# Patient Record
Sex: MALE | Race: WHITE
[De-identification: names, ages, dates, MRNs, and addresses within clinical notes are randomized per-mention and may not be internally consistent; named-entity substitution may affect disease eponyms.]

---

## 2017-01-23 ENCOUNTER — HOSPITAL ENCOUNTER (OUTPATIENT)
Dept: HOSPITAL 53 - SKLAB4 | Age: 82
End: 2017-01-23
Attending: FAMILY MEDICINE
Payer: MEDICARE

## 2017-01-23 DIAGNOSIS — G20: ICD-10-CM

## 2017-01-23 DIAGNOSIS — I10: Primary | ICD-10-CM

## 2017-01-23 LAB
ALBUMIN SERPL BCG-MCNC: 3.4 GM/DL (ref 3.2–5.2)
ALBUMIN/GLOB SERPL: 0.83 {RATIO} (ref 1–1.93)
ALP SERPL-CCNC: 126 U/L (ref 45–117)
ALT SERPL W P-5'-P-CCNC: 6 U/L (ref 12–78)
ANION GAP SERPL CALC-SCNC: 8 MEQ/L (ref 8–16)
AST SERPL-CCNC: 15 U/L (ref 15–37)
BILIRUB SERPL-MCNC: 0.3 MG/DL (ref 0.2–1)
BUN SERPL-MCNC: 22 MG/DL (ref 7–18)
CALCIUM SERPL-MCNC: 8.7 MG/DL (ref 8.8–10.2)
CHLORIDE SERPL-SCNC: 100 MEQ/L (ref 98–107)
CO2 SERPL-SCNC: 28 MEQ/L (ref 21–32)
CREAT SERPL-MCNC: 1.08 MG/DL (ref 0.7–1.3)
ERYTHROCYTE [DISTWIDTH] IN BLOOD BY AUTOMATED COUNT: 13.2 % (ref 11.5–14.5)
GFR SERPL CREATININE-BSD FRML MDRD: > 60 ML/MIN/{1.73_M2} (ref 35–?)
GLUCOSE SERPL-MCNC: 180 MG/DL (ref 83–110)
MCH RBC QN AUTO: 30.3 PG (ref 27–33)
MCHC RBC AUTO-ENTMCNC: 33.7 G/DL (ref 32–36.5)
MCV RBC AUTO: 89.9 FL (ref 80–96)
PLATELET # BLD AUTO: 246 K/MM3 (ref 150–450)
POTASSIUM SERPL-SCNC: 4.5 MEQ/L (ref 3.5–5.1)
PROT SERPL-MCNC: 7.5 GM/DL (ref 6.4–8.2)
SODIUM SERPL-SCNC: 136 MEQ/L (ref 136–145)
WBC # BLD AUTO: 11.1 K/MM3 (ref 4–10)

## 2017-02-21 ENCOUNTER — HOSPITAL ENCOUNTER (OUTPATIENT)
Dept: HOSPITAL 53 - SKLAB8 | Age: 82
End: 2017-02-21
Attending: FAMILY MEDICINE

## 2017-02-21 ENCOUNTER — HOSPITAL ENCOUNTER (OUTPATIENT)
Dept: HOSPITAL 53 - SKLAB8 | Age: 82
End: 2017-02-21
Attending: FAMILY MEDICINE
Payer: MEDICARE

## 2017-02-21 DIAGNOSIS — E11.65: Primary | ICD-10-CM

## 2017-02-21 DIAGNOSIS — Z00.00: Primary | ICD-10-CM

## 2017-03-16 ENCOUNTER — HOSPITAL ENCOUNTER (OUTPATIENT)
Dept: HOSPITAL 53 - SKLAB8 | Age: 82
End: 2017-03-16
Attending: FAMILY MEDICINE
Payer: MEDICARE

## 2017-03-16 DIAGNOSIS — R73.09: Primary | ICD-10-CM

## 2017-03-16 LAB — EST. AVERAGE GLUCOSE BLD GHB EST-MCNC: 217 MG/DL (ref 60–110)

## 2017-04-20 ENCOUNTER — HOSPITAL ENCOUNTER (OUTPATIENT)
Dept: HOSPITAL 53 - SKLAB8 | Age: 82
End: 2017-04-20
Attending: FAMILY MEDICINE
Payer: MEDICARE

## 2017-04-20 DIAGNOSIS — G20: Primary | ICD-10-CM

## 2017-04-20 LAB
ALBUMIN SERPL BCG-MCNC: 3.4 GM/DL (ref 3.2–5.2)
ALBUMIN/GLOB SERPL: 0.89 {RATIO} (ref 1–1.93)
ALP SERPL-CCNC: 119 U/L (ref 45–117)
ALT SERPL W P-5'-P-CCNC: 6 U/L (ref 12–78)
ANION GAP SERPL CALC-SCNC: 7 MEQ/L (ref 8–16)
AST SERPL-CCNC: 9 U/L (ref 15–37)
BILIRUB SERPL-MCNC: 0.3 MG/DL (ref 0.2–1)
BUN SERPL-MCNC: 20 MG/DL (ref 7–18)
CALCIUM SERPL-MCNC: 8.3 MG/DL (ref 8.8–10.2)
CHLORIDE SERPL-SCNC: 99 MEQ/L (ref 98–107)
CO2 SERPL-SCNC: 29 MEQ/L (ref 21–32)
CREAT SERPL-MCNC: 1.17 MG/DL (ref 0.7–1.3)
GFR SERPL CREATININE-BSD FRML MDRD: > 60 ML/MIN/{1.73_M2} (ref 35–?)
GLUCOSE SERPL-MCNC: 223 MG/DL (ref 83–110)
POTASSIUM SERPL-SCNC: 4.1 MEQ/L (ref 3.5–5.1)
PROT SERPL-MCNC: 7.2 GM/DL (ref 6.4–8.2)
SODIUM SERPL-SCNC: 135 MEQ/L (ref 136–145)

## 2017-06-15 ENCOUNTER — HOSPITAL ENCOUNTER (OUTPATIENT)
Dept: HOSPITAL 53 - SKLAB8 | Age: 82
End: 2017-06-15
Attending: FAMILY MEDICINE
Payer: MEDICARE

## 2017-06-15 DIAGNOSIS — E78.2: Primary | ICD-10-CM

## 2017-06-15 DIAGNOSIS — R73.01: ICD-10-CM

## 2017-06-15 LAB
ANION GAP SERPL CALC-SCNC: 5 MEQ/L (ref 8–16)
BUN SERPL-MCNC: 22 MG/DL (ref 7–18)
CALCIUM SERPL-MCNC: 8.4 MG/DL (ref 8.8–10.2)
CHLORIDE SERPL-SCNC: 98 MEQ/L (ref 98–107)
CHOLEST SERPL-MCNC: 186 MG/DL (ref ?–200)
CO2 SERPL-SCNC: 33 MEQ/L (ref 21–32)
CREAT SERPL-MCNC: 1.09 MG/DL (ref 0.7–1.3)
ERYTHROCYTE [DISTWIDTH] IN BLOOD BY AUTOMATED COUNT: 13.2 % (ref 11.5–14.5)
EST. AVERAGE GLUCOSE BLD GHB EST-MCNC: 209 MG/DL (ref 60–110)
GFR SERPL CREATININE-BSD FRML MDRD: > 60 ML/MIN/{1.73_M2} (ref 35–?)
GLUCOSE SERPL-MCNC: 287 MG/DL (ref 83–110)
MCH RBC QN AUTO: 30.6 PG (ref 27–33)
MCHC RBC AUTO-ENTMCNC: 32.4 G/DL (ref 32–36.5)
MCV RBC AUTO: 94.7 FL (ref 80–96)
PLATELET # BLD AUTO: 287 K/MM3 (ref 150–450)
POTASSIUM SERPL-SCNC: 4.6 MEQ/L (ref 3.5–5.1)
SODIUM SERPL-SCNC: 136 MEQ/L (ref 136–145)
TRIGL SERPL-MCNC: 91 MG/DL (ref ?–150)
WBC # BLD AUTO: 11.3 K/MM3 (ref 4–10)

## 2017-06-19 LAB — PHENOBARBITAL (PRIMIDONE): 4 UG/ML (ref 15–40)

## 2017-06-23 ENCOUNTER — HOSPITAL ENCOUNTER (OUTPATIENT)
Dept: HOSPITAL 53 - SKLAB8 | Age: 82
End: 2017-06-23
Attending: FAMILY MEDICINE
Payer: MEDICARE

## 2017-06-23 DIAGNOSIS — N39.9: Primary | ICD-10-CM

## 2017-06-23 LAB
ALBUMIN SERPL BCG-MCNC: 3.2 GM/DL (ref 3.2–5.2)
ALBUMIN/GLOB SERPL: 0.91 {RATIO} (ref 1–1.93)
ALP SERPL-CCNC: 137 U/L (ref 45–117)
ALT SERPL W P-5'-P-CCNC: < 6 U/L (ref 12–78)
ANION GAP SERPL CALC-SCNC: 8 MEQ/L (ref 8–16)
AST SERPL-CCNC: 17 U/L (ref 15–37)
BILIRUB SERPL-MCNC: 0.2 MG/DL (ref 0.2–1)
BUN SERPL-MCNC: 25 MG/DL (ref 7–18)
CALCIUM SERPL-MCNC: 8.2 MG/DL (ref 8.8–10.2)
CHLORIDE SERPL-SCNC: 102 MEQ/L (ref 98–107)
CO2 SERPL-SCNC: 27 MEQ/L (ref 21–32)
CREAT SERPL-MCNC: 1.18 MG/DL (ref 0.7–1.3)
ERYTHROCYTE [DISTWIDTH] IN BLOOD BY AUTOMATED COUNT: 13.4 % (ref 11.5–14.5)
GFR SERPL CREATININE-BSD FRML MDRD: > 60 ML/MIN/{1.73_M2} (ref 35–?)
GLUCOSE SERPL-MCNC: 110 MG/DL (ref 83–110)
MCH RBC QN AUTO: 31.7 PG (ref 27–33)
MCHC RBC AUTO-ENTMCNC: 34.6 G/DL (ref 32–36.5)
MCV RBC AUTO: 91.5 FL (ref 80–96)
PLATELET # BLD AUTO: 317 K/MM3 (ref 150–450)
POTASSIUM SERPL-SCNC: 4.9 MEQ/L (ref 3.5–5.1)
PROT SERPL-MCNC: 6.7 GM/DL (ref 6.4–8.2)
SODIUM SERPL-SCNC: 137 MEQ/L (ref 136–145)
WBC # BLD AUTO: 11.5 K/MM3 (ref 4–10)

## 2017-06-26 ENCOUNTER — HOSPITAL ENCOUNTER (OUTPATIENT)
Dept: HOSPITAL 53 - SKLAB8 | Age: 82
End: 2017-06-26
Attending: FAMILY MEDICINE
Payer: MEDICARE

## 2017-06-26 DIAGNOSIS — I49.8: Primary | ICD-10-CM

## 2017-06-28 ENCOUNTER — HOSPITAL ENCOUNTER (OUTPATIENT)
Dept: HOSPITAL 53 - SKLAB8 | Age: 82
End: 2017-06-28
Attending: FAMILY MEDICINE
Payer: MEDICARE

## 2017-06-28 DIAGNOSIS — E03.9: Primary | ICD-10-CM

## 2017-06-28 LAB — T4 FREE SERPL-MCNC: 0.39 NG/DL (ref 0.76–1.46)

## 2017-06-30 ENCOUNTER — HOSPITAL ENCOUNTER (OUTPATIENT)
Dept: HOSPITAL 53 - SKLAB8 | Age: 82
End: 2017-06-30
Attending: NURSE PRACTITIONER
Payer: MEDICARE

## 2017-06-30 DIAGNOSIS — I44.7: Primary | ICD-10-CM

## 2017-06-30 NOTE — ECGEPIP
Stationary ECG Study

                              [facility]

                                       

                                       Test Date:    2017

Pat Name:     SHARON MOODY             Department:   

Patient ID:   [id number]                 Room:         -

Gender:       M                        Technician:   SHIRLEY

:          1935               Requested By: PING VITAL

Order Number: [id number]     Reading MD:   Iris Bowden

                                 Measurements

Intervals                              Axis          

Rate:         53                       P:            87

MA:           180                      QRS:          15

QRSD:         145                      T:            40

QT:           477                                    

QTc:          452                                    

                           Interpretive Statements

SINUS BRADYCARDIA

RIGHT BUNDLE BRANCH BLOCK

SINCE 1/30/15 RIGHT BUNDLE BRANCH BLOCK IS NEW 

 

Electronically Signed On 2017 13:59:33 EDT by Iris Bowden

## 2017-08-04 ENCOUNTER — HOSPITAL ENCOUNTER (OUTPATIENT)
Dept: HOSPITAL 53 - SKLAB8 | Age: 82
End: 2017-08-04
Attending: FAMILY MEDICINE
Payer: MEDICARE

## 2017-08-04 DIAGNOSIS — E07.9: Primary | ICD-10-CM

## 2017-08-25 ENCOUNTER — HOSPITAL ENCOUNTER (OUTPATIENT)
Dept: HOSPITAL 53 - SKLAB8 | Age: 82
End: 2017-08-25
Attending: FAMILY MEDICINE
Payer: MEDICARE

## 2017-08-25 DIAGNOSIS — N39.9: Primary | ICD-10-CM

## 2017-08-25 LAB
ALBUMIN SERPL BCG-MCNC: 3.5 GM/DL (ref 3.2–5.2)
ALBUMIN/GLOB SERPL: 0.85 {RATIO} (ref 1–1.93)
ALP SERPL-CCNC: 122 U/L (ref 45–117)
ALT SERPL W P-5'-P-CCNC: 6 U/L (ref 12–78)
ANION GAP SERPL CALC-SCNC: 8 MEQ/L (ref 8–16)
AST SERPL-CCNC: 16 U/L (ref 15–37)
BASOPHILS # BLD AUTO: 0.1 K/MM3 (ref 0–0.2)
BASOPHILS NFR BLD AUTO: 0.5 % (ref 0–1)
BILIRUB SERPL-MCNC: 0.2 MG/DL (ref 0.2–1)
BUN SERPL-MCNC: 23 MG/DL (ref 7–18)
CALCIUM SERPL-MCNC: 8.4 MG/DL (ref 8.8–10.2)
CHLORIDE SERPL-SCNC: 101 MEQ/L (ref 98–107)
CO2 SERPL-SCNC: 29 MEQ/L (ref 21–32)
CREAT SERPL-MCNC: 0.96 MG/DL (ref 0.7–1.3)
EOSINOPHIL # BLD AUTO: 0.4 K/MM3 (ref 0–0.5)
EOSINOPHIL NFR BLD AUTO: 1.6 % (ref 0–3)
ERYTHROCYTE [DISTWIDTH] IN BLOOD BY AUTOMATED COUNT: 13.8 % (ref 11.5–14.5)
GFR SERPL CREATININE-BSD FRML MDRD: > 60 ML/MIN/{1.73_M2} (ref 35–?)
GLUCOSE SERPL-MCNC: 181 MG/DL (ref 83–110)
LARGE UNSTAINED CELL #: 0.2 K/MM3 (ref 0–0.4)
LARGE UNSTAINED CELL %: 0.7 % (ref 0–4)
LYMPHOCYTES # BLD AUTO: 0.9 K/MM3 (ref 1.5–4.5)
LYMPHOCYTES NFR BLD AUTO: 3.4 % (ref 24–44)
MCH RBC QN AUTO: 30.8 PG (ref 27–33)
MCHC RBC AUTO-ENTMCNC: 33.4 G/DL (ref 32–36.5)
MCV RBC AUTO: 92 FL (ref 80–96)
MONOCYTES # BLD AUTO: 1 K/MM3 (ref 0–0.8)
MONOCYTES NFR BLD AUTO: 4.7 % (ref 0–5)
NEUTROPHILS # BLD AUTO: 18.8 K/MM3 (ref 1.8–7.7)
NEUTROPHILS NFR BLD AUTO: 89.1 % (ref 36–66)
PLATELET # BLD AUTO: 348 K/MM3 (ref 150–450)
POTASSIUM SERPL-SCNC: 4.2 MEQ/L (ref 3.5–5.1)
PROT SERPL-MCNC: 7.6 GM/DL (ref 6.4–8.2)
SODIUM SERPL-SCNC: 138 MEQ/L (ref 136–145)
WBC # BLD AUTO: 21 K/MM3 (ref 4–10)

## 2017-08-25 NOTE — REP
PORTABLE CHEST:

 

AP portable view of the chest is performed and compared to a prior study of

05/24/2015.

 

There is stable bibasilar fibroatelectatic change compared to the prior study,

with no evidence of acute infiltrate.  The heart is not enlarged.  There is mild

calcification and tortuosity of the thoracic aorta.  The mediastinal silhouette

is unchanged.  There are degenerative changes of the spine.

 

IMPRESSION:

 

Stable bibasilar fibroatelectatic change without acute infiltrate.

 

 

Signed by

Akira Mccray MD 08/25/2017 11:58 A

## 2017-08-30 ENCOUNTER — HOSPITAL ENCOUNTER (OUTPATIENT)
Dept: HOSPITAL 53 - SKLAB8 | Age: 82
End: 2017-08-30
Attending: FAMILY MEDICINE
Payer: MEDICARE

## 2017-08-30 DIAGNOSIS — R56.9: ICD-10-CM

## 2017-08-30 DIAGNOSIS — E11.9: ICD-10-CM

## 2017-08-30 DIAGNOSIS — E03.9: Primary | ICD-10-CM

## 2017-10-11 ENCOUNTER — HOSPITAL ENCOUNTER (OUTPATIENT)
Dept: HOSPITAL 53 - SKLAB8 | Age: 82
End: 2017-10-11
Attending: FAMILY MEDICINE
Payer: MEDICARE

## 2017-10-11 DIAGNOSIS — E03.9: Primary | ICD-10-CM

## 2017-10-21 ENCOUNTER — HOSPITAL ENCOUNTER (EMERGENCY)
Dept: HOSPITAL 53 - M ED | Age: 82
LOS: 1 days | Discharge: HOME | End: 2017-10-22
Payer: MEDICARE

## 2017-10-21 VITALS — BODY MASS INDEX: 23.47 KG/M2 | HEIGHT: 72 IN | WEIGHT: 173.28 LBS

## 2017-10-21 DIAGNOSIS — Z79.899: ICD-10-CM

## 2017-10-21 DIAGNOSIS — Z79.82: ICD-10-CM

## 2017-10-21 DIAGNOSIS — N40.0: ICD-10-CM

## 2017-10-21 DIAGNOSIS — M50.81: ICD-10-CM

## 2017-10-21 DIAGNOSIS — Z86.73: ICD-10-CM

## 2017-10-21 DIAGNOSIS — S00.91XA: Primary | ICD-10-CM

## 2017-10-21 DIAGNOSIS — Z79.4: ICD-10-CM

## 2017-10-21 DIAGNOSIS — E11.9: ICD-10-CM

## 2017-10-21 DIAGNOSIS — N18.9: ICD-10-CM
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
## 2017-10-21 DIAGNOSIS — F32.9: ICD-10-CM
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
## 2017-10-21 DIAGNOSIS — I50.9: ICD-10-CM

## 2017-10-21 DIAGNOSIS — G40.909: ICD-10-CM

## 2017-10-21 DIAGNOSIS — I45.10: ICD-10-CM

## 2017-10-21 DIAGNOSIS — E11.21: ICD-10-CM
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
## 2017-10-21 LAB
ANION GAP SERPL CALC-SCNC: 6 MEQ/L (ref 8–16)
BUN SERPL-MCNC: 25 MG/DL (ref 7–18)
CALCIUM SERPL-MCNC: 8.4 MG/DL (ref 8.8–10.2)
CHLORIDE SERPL-SCNC: 99 MEQ/L (ref 98–107)
CO2 SERPL-SCNC: 27 MEQ/L (ref 21–32)
CREAT SERPL-MCNC: 1.23 MG/DL (ref 0.7–1.3)
GFR SERPL CREATININE-BSD FRML MDRD: 60 ML/MIN/{1.73_M2} (ref 35–?)
GLUCOSE SERPL-MCNC: 394 MG/DL (ref 83–110)
INR PPP: 0.93
POTASSIUM SERPL-SCNC: 5 MEQ/L (ref 3.5–5.1)
SODIUM SERPL-SCNC: 132 MEQ/L (ref 136–145)

## 2017-10-21 NOTE — REPUSA
HISTORY: FALL, UNWITNESSED.

 

TECHNIQUE: Axial CT imaging of cervical spine with sagittal and coronal reformatted imaging, without 
contrast.  DLP= 1094 mGy-cm.

 

FINDINGS: There is levoscoliosis of the cervical spine that may be positional.  There is no evidence 
of acute fracture, destructive bony lesion, spondylolisthesis, instability, or significant cervical s
yessenia canal stenosis.

 

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  No large disc herniati
on or soft tissue masses seen.

 

IMPRESSION: 1.  No evidence of fracture, destructive bony lesion, or instability.

2.  Multilevel degenerative disc disease and facet joint osteoarthritis with no large soft tissue mas
s lesions seen.

 

Clinical correlation and followup imaging may be warranted as clinically indicated.

.

     Electronically signed by FRANCESCA SANTOYO MD on 10/21/2017 11:42:59 PM ET

## 2017-10-21 NOTE — REPUSA
HISTORY:  FALL.

 

TECHNIQUE: Axial CT imaging of brain without contrast.  DLP= 1094 mGy-cm.

 

FINDINGS: Ventricles and sulci are enlarged consistent with generalized cerebral atrophy.  There is e
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

 

There is no evidence of intracranial hemorrhage, cortical mass lesion, mass effect with midline shift
, acute infarct, or abnormal extra-axial fluid collection seen.  The orbits, paranasal sinuses, masto
ids, and CP angle regions are normal.  No skull fracture is seen.  There is a scalp hematoma over the
 left frontal calvarium.

 

IMPRESSION: 1.  Scalp hematoma over the left frontal calvarium with no skull fracture seen.  No intra
cranial hemorrhage, mass, or acute infarct seen.

2.  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

 

 

     Electronically signed by FRANCESCA SANTOYO MD on 10/21/2017 11:35:38 PM ET

## 2017-10-22 VITALS — DIASTOLIC BLOOD PRESSURE: 69 MMHG | SYSTOLIC BLOOD PRESSURE: 148 MMHG

## 2017-10-22 LAB
BASOPHILS # BLD AUTO: 0.1 10^3/UL (ref 0–0.2)
BASOPHILS NFR BLD AUTO: 0.6 % (ref 0–1)
EOSINOPHIL # BLD AUTO: 0.4 10^3/UL (ref 0–0.5)
EOSINOPHIL NFR BLD AUTO: 3.6 % (ref 0–3)
ERYTHROCYTE [DISTWIDTH] IN BLOOD BY AUTOMATED COUNT: 12.8 % (ref 11.5–14.5)
IMM GRANULOCYTES NFR BLD: 1.7 % (ref 0–0)
LYMPHOCYTES # BLD AUTO: 1.2 10^3/UL (ref 1.5–4.5)
LYMPHOCYTES NFR BLD AUTO: 10.8 % (ref 24–44)
MCH RBC QN AUTO: 30.6 PG (ref 27–33)
MCHC RBC AUTO-ENTMCNC: 31.1 G/DL (ref 32–36.5)
MCV RBC AUTO: 98.7 FL (ref 80–96)
MONOCYTES # BLD AUTO: 1.4 10^3/UL (ref 0–0.8)
MONOCYTES NFR BLD AUTO: 11.8 % (ref 0–5)
NEUTROPHILS # BLD AUTO: 8.2 10^3/UL (ref 1.8–7.7)
NEUTROPHILS NFR BLD AUTO: 71.5 % (ref 36–66)
NRBC BLD AUTO-RTO: 0 % (ref 0–0)
PLATELET # BLD AUTO: 259 10^3/UL (ref 150–450)
WBC # BLD AUTO: 11.4 10^3/UL (ref 4–10)

## 2017-10-22 NOTE — ECGEPIP
Stationary ECG Study

                           [facility]

                                       

                                       Test Date:    2017-10-21

Pat Name:     SHARON MOODY             Department:   

Patient ID:   [id number]                 Room:         -

Gender:       M                        Technician:   kim

:          1935               Requested By: ABY ACEVEDO

Order Number: [id number]     Reading MD:   Nicolasa Hicks

                                 Measurements

Intervals                              Axis          

Rate:         62                       P:            36

WA:           176                      QRS:          34

QRSD:         143                      T:            21

QT:           435                                    

QTc:          444                                    

                           Interpretive Statements

SINUS RHYTHM

RIGHT BUNDLE BRANCH BLOCK

INCREASED RATE 17

Electronically Signed On 10- 8:11:32 EDT by Nicolasa Hicks

## 2017-12-02 ENCOUNTER — HOSPITAL ENCOUNTER (OUTPATIENT)
Dept: HOSPITAL 53 - SKLAB8 | Age: 82
End: 2017-12-02
Attending: FAMILY MEDICINE
Payer: MEDICAID

## 2017-12-02 DIAGNOSIS — E16.2: Primary | ICD-10-CM

## 2017-12-21 ENCOUNTER — HOSPITAL ENCOUNTER (OUTPATIENT)
Dept: HOSPITAL 53 - SKLAB8 | Age: 82
End: 2017-12-21
Attending: FAMILY MEDICINE
Payer: MEDICARE

## 2017-12-21 DIAGNOSIS — E11.9: Primary | ICD-10-CM

## 2017-12-21 LAB
ANION GAP SERPL CALC-SCNC: 9 MEQ/L (ref 8–16)
BUN SERPL-MCNC: 38 MG/DL (ref 7–18)
CALCIUM SERPL-MCNC: 8.5 MG/DL (ref 8.8–10.2)
CHLORIDE SERPL-SCNC: 105 MEQ/L (ref 98–107)
CHOLEST SERPL-MCNC: 180 MG/DL (ref ?–200)
CO2 SERPL-SCNC: 29 MEQ/L (ref 21–32)
CREAT SERPL-MCNC: 1.24 MG/DL (ref 0.7–1.3)
ERYTHROCYTE [DISTWIDTH] IN BLOOD BY AUTOMATED COUNT: 13.2 % (ref 11.5–14.5)
EST. AVERAGE GLUCOSE BLD GHB EST-MCNC: 223 MG/DL (ref 60–110)
GFR SERPL CREATININE-BSD FRML MDRD: 59.4 ML/MIN/{1.73_M2} (ref 35–?)
GLUCOSE SERPL-MCNC: 436 MG/DL (ref 83–110)
MCH RBC QN AUTO: 29.5 PG (ref 27–33)
MCHC RBC AUTO-ENTMCNC: 32 G/DL (ref 32–36.5)
MCV RBC AUTO: 92.1 FL (ref 80–96)
NRBC BLD AUTO-RTO: 0 % (ref 0–0)
PLATELET # BLD AUTO: 358 10^3/UL (ref 150–450)
POTASSIUM SERPL-SCNC: 4.7 MEQ/L (ref 3.5–5.1)
SODIUM SERPL-SCNC: 143 MEQ/L (ref 136–145)
TRIGL SERPL-MCNC: 127 MG/DL (ref ?–150)
WBC # BLD AUTO: 10.1 10^3/UL (ref 4–10)

## 2017-12-28 LAB — PHENOBARBITAL (PRIMIDONE): 10 UG/ML (ref 15–40)

## 2018-01-02 ENCOUNTER — HOSPITAL ENCOUNTER (OUTPATIENT)
Dept: HOSPITAL 53 - SKLAB8 | Age: 83
End: 2018-01-02
Attending: FAMILY MEDICINE
Payer: MEDICARE

## 2018-01-02 DIAGNOSIS — E11.9: Primary | ICD-10-CM

## 2018-01-02 DIAGNOSIS — Z79.899: ICD-10-CM

## 2018-01-02 DIAGNOSIS — Z51.81: Primary | ICD-10-CM

## 2018-01-02 LAB
APPEARANCE, URINE: CLEAR
BACTERIA UR QL AUTO: NEGATIVE
BILIRUBIN, URINE AUTO: NEGATIVE
BLOOD, URINE BLOOD: NEGATIVE
GLUCOSE, URINE (UA) AUTO: (no result) MG/DL
KETONE, URINE AUTO: (no result) MG/DL
LEUKOCYTE ESTERASE UR QL STRIP.AUTO: NEGATIVE
NITRITE, URINE AUTO: NEGATIVE
PH,URINE: 5 UNITS (ref 5–9)
PROT UR QL STRIP.AUTO: NEGATIVE MG/DL
RBC, URINE AUTO: 1 /HPF (ref 0–3)
SPECIFIC GRAVITY URINE AUTO: 1.01 (ref 1–1.03)
SQUAMOUS #/AREA URNS AUTO: 0 /HPF (ref 0–6)
UROBILINOGEN, URINE AUTO: 0.2 MG/DL (ref 0–2)
WBC, URINE AUTO: 1 /HPF (ref 0–3)

## 2018-01-02 PROCEDURE — 36415 COLL VENOUS BLD VENIPUNCTURE: CPT

## 2018-01-02 PROCEDURE — 81001 URINALYSIS AUTO W/SCOPE: CPT

## 2018-01-04 LAB — LEVETIRACETAM SERPL-MCNC: 31.8 UG/ML (ref 10–40)

## 2018-01-22 ENCOUNTER — HOSPITAL ENCOUNTER (OUTPATIENT)
Dept: HOSPITAL 53 - SKLAB8 | Age: 83
End: 2018-01-22
Attending: FAMILY MEDICINE
Payer: MEDICARE

## 2018-01-22 DIAGNOSIS — F02.80: ICD-10-CM

## 2018-01-22 DIAGNOSIS — E78.4: ICD-10-CM

## 2018-01-22 DIAGNOSIS — G20: ICD-10-CM

## 2018-01-22 DIAGNOSIS — E11.9: Primary | ICD-10-CM

## 2018-01-22 DIAGNOSIS — I10: ICD-10-CM

## 2018-01-22 LAB
ANION GAP: 6 MEQ/L (ref 8–16)
BLOOD UREA NITROGEN: 19 MG/DL (ref 7–18)
CALCIUM LEVEL: 8.1 MG/DL (ref 8.8–10.2)
CARBON DIOXIDE LEVEL: 29 MEQ/L (ref 21–32)
CHLORIDE LEVEL: 100 MEQ/L (ref 98–107)
CREATININE FOR GFR: 0.9 MG/DL (ref 0.7–1.3)
GFR SERPL CREATININE-BSD FRML MDRD: > 60 ML/MIN/{1.73_M2} (ref 35–?)
GLUCOSE, FASTING: 352 MG/DL (ref 70–100)
HEMATOCRIT: 34.1 % (ref 42–52)
HEMOGLOBIN: 11.1 G/DL (ref 14–18)
MEAN CORPUSCULAR HEMOGLOBIN: 29.4 PG (ref 27–33)
MEAN CORPUSCULAR HGB CONC: 32.6 G/DL (ref 32–36.5)
MEAN CORPUSCULAR VOLUME: 90.2 FL (ref 80–96)
NRBC BLD AUTO-RTO: 0 % (ref 0–0)
PLATELET COUNT, AUTOMATED: 296 10^3/UL (ref 150–450)
POTASSIUM SERUM: 5.1 MEQ/L (ref 3.5–5.1)
RED BLOOD COUNT: 3.78 10^6/UL (ref 4.3–6.1)
RED CELL DISTRIBUTION WIDTH: 13.2 % (ref 11.5–14.5)
SODIUM LEVEL: 135 MEQ/L (ref 136–145)
WHITE BLOOD COUNT: 6.9 10^3/UL (ref 4–10)

## 2018-01-22 PROCEDURE — 36415 COLL VENOUS BLD VENIPUNCTURE: CPT

## 2018-01-31 ENCOUNTER — HOSPITAL ENCOUNTER (OUTPATIENT)
Dept: HOSPITAL 53 - SKLAB8 | Age: 83
End: 2018-01-31
Attending: FAMILY MEDICINE
Payer: MEDICARE

## 2018-01-31 DIAGNOSIS — I50.9: Primary | ICD-10-CM

## 2018-01-31 LAB
ANION GAP: 5 MEQ/L (ref 8–16)
BLOOD UREA NITROGEN: 21 MG/DL (ref 7–18)
CALCIUM LEVEL: 7.5 MG/DL (ref 8.8–10.2)
CARBON DIOXIDE LEVEL: 27 MEQ/L (ref 21–32)
CHLORIDE LEVEL: 101 MEQ/L (ref 98–107)
CREATININE FOR GFR: 1.09 MG/DL (ref 0.7–1.3)
FREE T4: 1.11 NG/DL (ref 0.76–1.46)
GFR SERPL CREATININE-BSD FRML MDRD: > 60 ML/MIN/{1.73_M2} (ref 35–?)
GLUCOSE, FASTING: 252 MG/DL (ref 70–100)
MAGNESIUM LEVEL: 2.5 MG/DL (ref 1.8–2.4)
POTASSIUM SERUM: 5.2 MEQ/L (ref 3.5–5.1)
SODIUM LEVEL: 133 MEQ/L (ref 136–145)
THYROID STIMULATING HORMONE: 1.57 UIU/ML (ref 0.36–3.74)

## 2018-01-31 PROCEDURE — 93005 ELECTROCARDIOGRAM TRACING: CPT

## 2018-02-01 ENCOUNTER — HOSPITAL ENCOUNTER (OUTPATIENT)
Dept: HOSPITAL 53 - SKLAB8 | Age: 83
End: 2018-02-01
Attending: FAMILY MEDICINE
Payer: MEDICARE

## 2018-02-01 DIAGNOSIS — E87.5: Primary | ICD-10-CM

## 2018-02-01 LAB — POTASSIUM SERUM: 4.7 MEQ/L (ref 3.5–5.1)

## 2018-02-01 PROCEDURE — 84132 ASSAY OF SERUM POTASSIUM: CPT

## 2018-02-05 LAB — T3 REVERSE: 18.4 NG/DL (ref 9.2–24.1)

## 2018-02-27 ENCOUNTER — HOSPITAL ENCOUNTER (OUTPATIENT)
Dept: HOSPITAL 53 - SKLAB8 | Age: 83
End: 2018-02-27
Attending: FAMILY MEDICINE
Payer: MEDICARE

## 2018-02-27 DIAGNOSIS — R50.9: Primary | ICD-10-CM

## 2018-02-27 LAB
APPEARANCE, URINE: CLEAR
BACTERIA UR QL AUTO: NEGATIVE
BASO #: 0 10^3/UL (ref 0–0.2)
BASO %: 0.3 % (ref 0–1)
BILIRUBIN, URINE AUTO: NEGATIVE
BLOOD, URINE BLOOD: NEGATIVE
EOS #: 0 10^3/UL (ref 0–0.5)
EOSINOPHIL NFR BLD AUTO: 0 % (ref 0–3)
GLUCOSE, URINE (UA) AUTO: (no result) MG/DL
HEMATOCRIT: 35.2 % (ref 42–52)
HEMOGLOBIN: 11.6 G/DL (ref 14–18)
IMMATURE GRANULOCYTE %: 0.8 % (ref 0–3)
KETONE, URINE AUTO: (no result) MG/DL
LEUKOCYTE ESTERASE UR QL STRIP.AUTO: NEGATIVE
LYMPH #: 0.3 10^3/UL (ref 1.5–4.5)
LYMPH %: 1.6 % (ref 24–44)
MEAN CORPUSCULAR HEMOGLOBIN: 29.9 PG (ref 27–33)
MEAN CORPUSCULAR HGB CONC: 33 G/DL (ref 32–36.5)
MEAN CORPUSCULAR VOLUME: 90.7 FL (ref 80–96)
MONO #: 0.8 10^3/UL (ref 0–0.8)
MONO %: 5.3 % (ref 0–5)
NEUTROPHILS #: 14.7 10^3/UL (ref 1.8–7.7)
NEUTROPHILS %: 92 % (ref 36–66)
NITRITE, URINE AUTO: NEGATIVE
NRBC BLD AUTO-RTO: 0 % (ref 0–0)
PH,URINE: 5 UNITS (ref 5–9)
PLATELET COUNT, AUTOMATED: 239 10^3/UL (ref 150–450)
POSITIVE DIFF: (no result)
PROT UR QL STRIP.AUTO: NEGATIVE MG/DL
RBC, URINE AUTO: 1 /HPF (ref 0–3)
RED BLOOD COUNT: 3.88 10^6/UL (ref 4.3–6.1)
RED CELL DISTRIBUTION WIDTH: 14 % (ref 11.5–14.5)
SPECIFIC GRAVITY URINE AUTO: 1.03 (ref 1–1.03)
SQUAMOUS #/AREA URNS AUTO: 0 /HPF (ref 0–6)
UROBILINOGEN, URINE AUTO: 2 MG/DL (ref 0–2)
WBC, URINE AUTO: 1 /HPF (ref 0–3)
WHITE BLOOD COUNT: 16 10^3/UL (ref 4–10)

## 2018-02-27 PROCEDURE — 36415 COLL VENOUS BLD VENIPUNCTURE: CPT

## 2018-03-01 ENCOUNTER — HOSPITAL ENCOUNTER (OUTPATIENT)
Dept: HOSPITAL 53 - SKLAB8 | Age: 83
End: 2018-03-01
Attending: FAMILY MEDICINE
Payer: MEDICARE

## 2018-03-01 DIAGNOSIS — I11.0: Primary | ICD-10-CM

## 2018-03-01 DIAGNOSIS — E03.9: ICD-10-CM

## 2018-03-01 LAB
ADD MANUAL DIFFER: YES
ATYPICAL LYMPH: 4 % (ref 0–5)
BANDS: 4 % (ref ?–11)
DIFF SLIDE NUMBER: 240
EOSINOPHILS: 5 % (ref 0–5)
HEMATOCRIT: 35.8 % (ref 42–52)
HEMOGLOBIN: 11.6 G/DL (ref 14–18)
LYMPHOCYTES: 12 % (ref 16–52)
MEAN CORPUSCULAR HEMOGLOBIN: 29.5 PG (ref 27–33)
MEAN CORPUSCULAR HGB CONC: 32.4 G/DL (ref 32–36.5)
MEAN CORPUSCULAR VOLUME: 91.1 FL (ref 80–96)
MONOCYTES: 8 % (ref 0–8)
NEUTROPHILS: 67 % (ref 35–75)
NRBC BLD AUTO-RTO: 0 % (ref 0–0)
PLATELET BLD QL SMEAR: NORMAL
PLATELET COUNT, AUTOMATED: 150 10^3/UL (ref 150–450)
POSITIVE MORPH: (no result)
RBC MORPHOLOGY: NORMAL
RED BLOOD COUNT: 3.93 10^6/UL (ref 4.3–6.1)
RED CELL DISTRIBUTION WIDTH: 13.6 % (ref 11.5–14.5)
WHITE BLOOD COUNT: 4.3 10^3/UL (ref 4–10)

## 2018-03-01 PROCEDURE — 85025 COMPLETE CBC W/AUTO DIFF WBC: CPT

## 2018-03-15 ENCOUNTER — HOSPITAL ENCOUNTER (OUTPATIENT)
Dept: HOSPITAL 53 - SKLAB8 | Age: 83
End: 2018-03-15
Attending: FAMILY MEDICINE
Payer: MEDICARE

## 2018-03-15 DIAGNOSIS — Z79.4: ICD-10-CM

## 2018-03-15 DIAGNOSIS — G20: Primary | ICD-10-CM

## 2018-03-15 DIAGNOSIS — I10: ICD-10-CM

## 2018-03-15 LAB
ANION GAP: 8 MEQ/L (ref 8–16)
BLOOD UREA NITROGEN: 22 MG/DL (ref 7–18)
CALCIUM LEVEL: 8.3 MG/DL (ref 8.8–10.2)
CARBON DIOXIDE LEVEL: 27 MEQ/L (ref 21–32)
CHLORIDE LEVEL: 100 MEQ/L (ref 98–107)
CREATININE FOR GFR: 0.9 MG/DL (ref 0.7–1.3)
EST. AVERAGE GLUCOSE BLD GHB EST-MCNC: 232 MG/DL (ref 60–110)
GFR SERPL CREATININE-BSD FRML MDRD: > 60 ML/MIN/{1.73_M2} (ref 35–?)
GLUCOSE, FASTING: 341 MG/DL (ref 70–100)
HEMATOCRIT: 38.2 % (ref 42–52)
HEMOGLOBIN: 12.4 G/DL (ref 14–18)
MEAN CORPUSCULAR HEMOGLOBIN: 29.5 PG (ref 27–33)
MEAN CORPUSCULAR HGB CONC: 32.5 G/DL (ref 32–36.5)
MEAN CORPUSCULAR VOLUME: 91 FL (ref 80–96)
NRBC BLD AUTO-RTO: 0 % (ref 0–0)
PLATELET COUNT, AUTOMATED: 369 10^3/UL (ref 150–450)
POTASSIUM SERUM: 5.2 MEQ/L (ref 3.5–5.1)
RED BLOOD COUNT: 4.2 10^6/UL (ref 4.3–6.1)
RED CELL DISTRIBUTION WIDTH: 13.2 % (ref 11.5–14.5)
SODIUM LEVEL: 135 MEQ/L (ref 136–145)
WHITE BLOOD COUNT: 6.9 10^3/UL (ref 4–10)

## 2018-03-15 PROCEDURE — 83036 HEMOGLOBIN GLYCOSYLATED A1C: CPT

## 2018-03-17 ENCOUNTER — HOSPITAL ENCOUNTER (OUTPATIENT)
Dept: HOSPITAL 53 - SKLAB8 | Age: 83
End: 2018-03-17
Attending: FAMILY MEDICINE
Payer: MEDICARE

## 2018-03-17 DIAGNOSIS — E87.5: Primary | ICD-10-CM

## 2018-03-17 LAB — POTASSIUM SERUM: 5 MEQ/L (ref 3.5–5.1)

## 2018-03-17 PROCEDURE — 84132 ASSAY OF SERUM POTASSIUM: CPT

## 2018-03-18 ENCOUNTER — HOSPITAL ENCOUNTER (EMERGENCY)
Dept: HOSPITAL 53 - M ED | Age: 83
Discharge: HOME | End: 2018-03-18
Payer: MEDICARE

## 2018-03-18 DIAGNOSIS — G40.909: ICD-10-CM

## 2018-03-18 DIAGNOSIS — N18.3: ICD-10-CM

## 2018-03-18 DIAGNOSIS — Z79.4: ICD-10-CM

## 2018-03-18 DIAGNOSIS — R41.82: Primary | ICD-10-CM

## 2018-03-18 DIAGNOSIS — Z79.899: ICD-10-CM

## 2018-03-18 DIAGNOSIS — E10.9: ICD-10-CM

## 2018-03-18 DIAGNOSIS — I13.0: ICD-10-CM

## 2018-03-18 DIAGNOSIS — I50.9: ICD-10-CM

## 2018-03-18 DIAGNOSIS — Z79.82: ICD-10-CM

## 2018-03-18 DIAGNOSIS — Z79.890: ICD-10-CM

## 2018-03-18 DIAGNOSIS — G30.9: ICD-10-CM

## 2018-03-18 DIAGNOSIS — Z66: ICD-10-CM

## 2018-03-18 LAB
ABG BASE EXCESS: -1.1 (ref -2–2)
ABG HCO3: 24.1 MEQ/L (ref 22–26)
ABG O2 SATURATION: 96.8 % (ref 95–99)
ABG PARTIAL PRESSURE CO2: 41.7 MMHG (ref 35–45)
ABG PARTIAL PRESSURE O2: 90.1 MMHG (ref 75–100)
ABG PH (ARTERIAL): 7.38 UNITS (ref 7.35–7.45)
ABG STANDARD HCO3: 23.6 MEQ/L (ref 22–26)
ABG TOTAL CO2: 25.3 MEQ/L (ref 23–31)
ALBUMIN/GLOBULIN RATIO: 0.79 (ref 1–1.93)
ALBUMIN: 3.1 GM/DL (ref 3.2–5.2)
ALKALINE PHOSPHATASE: 652 U/L (ref 45–117)
ALT SERPL W P-5'-P-CCNC: 8 U/L (ref 12–78)
AMMONIA PLAS-SCNC: 23 UMOL/L (ref ?–32)
ANION GAP: 7 MEQ/L (ref 8–16)
AST SERPL-CCNC: 30 U/L (ref 7–37)
BASO #: 0.1 10^3/UL (ref 0–0.2)
BASO %: 0.7 % (ref 0–1)
BILIRUB CONJ SERPL-MCNC: 0.3 MG/DL (ref 0–0.2)
BILIRUBIN,TOTAL: 0.5 MG/DL (ref 0.2–1)
BLOOD UREA NITROGEN: 22 MG/DL (ref 7–18)
CALCIUM LEVEL: 8.1 MG/DL (ref 8.8–10.2)
CARBON DIOXIDE LEVEL: 28 MEQ/L (ref 21–32)
CHLORIDE LEVEL: 100 MEQ/L (ref 98–107)
CK MB CFR.DF SERPL CALC: 2.72
CK SERPL-CCNC: 66 U/L (ref 39–308)
CK-MB VALUE MASS: 1.8 NG/ML (ref 0–3.6)
CREATININE FOR GFR: 0.99 MG/DL (ref 0.7–1.3)
EOS #: 0.3 10^3/UL (ref 0–0.5)
EOSINOPHIL NFR BLD AUTO: 3.8 % (ref 0–3)
GFR SERPL CREATININE-BSD FRML MDRD: > 60 ML/MIN/{1.73_M2} (ref 35–?)
GLUCOSE, FASTING: 265 MG/DL (ref 70–100)
HEMATOCRIT: 38.4 % (ref 42–52)
HEMOGLOBIN: 12.7 G/DL (ref 14–18)
IMMATURE GRANULOCYTE %: 2 % (ref 0–3)
LACTIC ACID SEPSIS PROTOCOL: 2.3 MMOL/L (ref 0.4–2)
LEUKOCYTE ESTERASE UR AUTO RFX: NEGATIVE
LYMPH #: 1.1 10^3/UL (ref 1.5–4.5)
LYMPH %: 14.7 % (ref 24–44)
MAGNESIUM LEVEL: 2.2 MG/DL (ref 1.8–2.4)
MEAN CORPUSCULAR HEMOGLOBIN: 29.7 PG (ref 27–33)
MEAN CORPUSCULAR HGB CONC: 33.1 G/DL (ref 32–36.5)
MEAN CORPUSCULAR VOLUME: 89.9 FL (ref 80–96)
MONO #: 0.8 10^3/UL (ref 0–0.8)
MONO %: 10.3 % (ref 0–5)
NEUTROPHILS #: 5.2 10^3/UL (ref 1.8–7.7)
NEUTROPHILS %: 68.5 % (ref 36–66)
NRBC BLD AUTO-RTO: 0 % (ref 0–0)
PLATELET COUNT, AUTOMATED: 386 10^3/UL (ref 150–450)
POTASSIUM SERUM: 4.2 MEQ/L (ref 3.5–5.1)
RED BLOOD COUNT: 4.27 10^6/UL (ref 4.3–6.1)
RED CELL DISTRIBUTION WIDTH: 13.3 % (ref 11.5–14.5)
SODIUM LEVEL: 135 MEQ/L (ref 136–145)
SPECIFIC GRAVITY UR AUTO RFX: 1.01 (ref 1–1.03)
SQUAM EPITHELIAL CELL UR AURFX: 0 /HPF (ref 0–6)
TOTAL PROTEIN: 7 GM/DL (ref 6.4–8.2)
TROPONIN I: 0.02 NG/ML (ref ?–0.1)
WHITE BLOOD COUNT: 7.5 10^3/UL (ref 4–10)

## 2018-03-18 PROCEDURE — 71045 X-RAY EXAM CHEST 1 VIEW: CPT

## 2018-03-18 RX ADMIN — SODIUM CHLORIDE 1 MLS/HR: 9 INJECTION, SOLUTION INTRAVENOUS at 10:00

## 2018-03-18 RX ADMIN — SODIUM CHLORIDE 1 MLS/HR: 9 INJECTION, SOLUTION INTRAVENOUS at 11:26

## 2018-03-22 ENCOUNTER — HOSPITAL ENCOUNTER (OUTPATIENT)
Dept: HOSPITAL 53 - SKLAB8 | Age: 83
End: 2018-03-22
Attending: FAMILY MEDICINE
Payer: MEDICARE

## 2018-03-22 DIAGNOSIS — R14.0: Primary | ICD-10-CM

## 2018-03-22 LAB
ALBUMIN/GLOBULIN RATIO: 0.84 (ref 1–1.93)
ALBUMIN: 2.7 GM/DL (ref 3.2–5.2)
ALKALINE PHOSPHATASE: 484 U/L (ref 45–117)
ALT SERPL W P-5'-P-CCNC: 12 U/L (ref 12–78)
ANION GAP: 8 MEQ/L (ref 8–16)
AST SERPL-CCNC: 35 U/L (ref 7–37)
BASO #: 0.1 10^3/UL (ref 0–0.2)
BASO %: 0.3 % (ref 0–1)
BILIRUBIN,TOTAL: 0.6 MG/DL (ref 0.2–1)
BLOOD UREA NITROGEN: 26 MG/DL (ref 7–18)
CALCIUM LEVEL: 7.9 MG/DL (ref 8.8–10.2)
CARBON DIOXIDE LEVEL: 26 MEQ/L (ref 21–32)
CHLORIDE LEVEL: 96 MEQ/L (ref 98–107)
CREATININE FOR GFR: 1.16 MG/DL (ref 0.7–1.3)
EOS #: 0.5 10^3/UL (ref 0–0.5)
EOSINOPHIL NFR BLD AUTO: 2.8 % (ref 0–3)
GFR SERPL CREATININE-BSD FRML MDRD: > 60 ML/MIN/{1.73_M2} (ref 35–?)
GLUCOSE, FASTING: 397 MG/DL (ref 70–100)
HEMATOCRIT: 32.7 % (ref 42–52)
HEMOGLOBIN: 11 G/DL (ref 14–18)
IMMATURE GRANULOCYTE %: 1.2 % (ref 0–3)
LYMPH #: 0.9 10^3/UL (ref 1.5–4.5)
LYMPH %: 5.5 % (ref 24–44)
MEAN CORPUSCULAR HEMOGLOBIN: 30 PG (ref 27–33)
MEAN CORPUSCULAR HGB CONC: 33.6 G/DL (ref 32–36.5)
MEAN CORPUSCULAR VOLUME: 89.1 FL (ref 80–96)
MONO #: 1.3 10^3/UL (ref 0–0.8)
MONO %: 7.7 % (ref 0–5)
NEUTROPHILS #: 14 10^3/UL (ref 1.8–7.7)
NEUTROPHILS %: 82.5 % (ref 36–66)
NRBC BLD AUTO-RTO: 0 % (ref 0–0)
PLATELET COUNT, AUTOMATED: 298 10^3/UL (ref 150–450)
POTASSIUM SERUM: 4.5 MEQ/L (ref 3.5–5.1)
RED BLOOD COUNT: 3.67 10^6/UL (ref 4.3–6.1)
RED CELL DISTRIBUTION WIDTH: 13.8 % (ref 11.5–14.5)
SODIUM LEVEL: 130 MEQ/L (ref 136–145)
TOTAL PROTEIN: 5.9 GM/DL (ref 6.4–8.2)
WHITE BLOOD COUNT: 17 10^3/UL (ref 4–10)

## 2018-03-22 PROCEDURE — 74018 RADEX ABDOMEN 1 VIEW: CPT

## 2018-03-26 ENCOUNTER — HOSPITAL ENCOUNTER (OUTPATIENT)
Dept: HOSPITAL 53 - M RAD | Age: 83
End: 2018-03-26
Attending: FAMILY MEDICINE
Payer: MEDICARE

## 2018-03-26 DIAGNOSIS — K80.20: ICD-10-CM

## 2018-03-26 DIAGNOSIS — R19.01: Primary | ICD-10-CM

## 2018-03-26 PROCEDURE — 76705 ECHO EXAM OF ABDOMEN: CPT

## 2018-03-29 ENCOUNTER — HOSPITAL ENCOUNTER (OUTPATIENT)
Dept: HOSPITAL 53 - M RAD | Age: 83
End: 2018-03-29
Attending: FAMILY MEDICINE
Payer: MEDICARE

## 2018-03-29 DIAGNOSIS — K56.1: ICD-10-CM

## 2018-03-29 DIAGNOSIS — K82.8: Primary | ICD-10-CM

## 2018-05-04 ENCOUNTER — HOSPITAL ENCOUNTER (OUTPATIENT)
Dept: HOSPITAL 53 - SKLAB8 | Age: 83
End: 2018-05-04
Attending: FAMILY MEDICINE
Payer: MEDICARE

## 2018-05-04 DIAGNOSIS — R19.7: Primary | ICD-10-CM

## 2018-05-04 LAB
ANION GAP: 5 MEQ/L (ref 8–16)
BASO #: 0 10^3/UL (ref 0–0.2)
BASO %: 0.5 % (ref 0–1)
BLOOD UREA NITROGEN: 25 MG/DL (ref 7–18)
CALCIUM LEVEL: 7.7 MG/DL (ref 8.8–10.2)
CARBON DIOXIDE LEVEL: 26 MEQ/L (ref 21–32)
CHLORIDE LEVEL: 105 MEQ/L (ref 98–107)
CREATININE FOR GFR: 0.98 MG/DL (ref 0.7–1.3)
EOS #: 0.5 10^3/UL (ref 0–0.5)
EOSINOPHIL NFR BLD AUTO: 6.2 % (ref 0–3)
GFR SERPL CREATININE-BSD FRML MDRD: > 60 ML/MIN/{1.73_M2} (ref 35–?)
GLUCOSE, FASTING: 245 MG/DL (ref 70–100)
HEMATOCRIT: 33 % (ref 42–52)
HEMOGLOBIN: 10.8 G/DL (ref 13.5–17.5)
IMMATURE GRANULOCYTE %: 1.1 % (ref 0–3)
LYMPH #: 0.8 10^3/UL (ref 1.5–4.5)
LYMPH %: 8.6 % (ref 24–44)
MEAN CORPUSCULAR HEMOGLOBIN: 29.5 PG (ref 27–33)
MEAN CORPUSCULAR HGB CONC: 32.7 G/DL (ref 32–36.5)
MEAN CORPUSCULAR VOLUME: 90.2 FL (ref 80–96)
MONO #: 0.7 10^3/UL (ref 0–0.8)
MONO %: 8.1 % (ref 0–5)
NEUTROPHILS #: 6.6 10^3/UL (ref 1.8–7.7)
NEUTROPHILS %: 75.5 % (ref 36–66)
NRBC BLD AUTO-RTO: 0 % (ref 0–0)
PLATELET COUNT, AUTOMATED: 243 10^3/UL (ref 150–450)
POTASSIUM SERUM: 4.6 MEQ/L (ref 3.5–5.1)
RED BLOOD COUNT: 3.66 10^6/UL (ref 4.3–6.1)
RED CELL DISTRIBUTION WIDTH: 14.1 % (ref 11.5–14.5)
SODIUM LEVEL: 136 MEQ/L (ref 136–145)
WHITE BLOOD COUNT: 8.7 10^3/UL (ref 4–10)

## 2018-05-04 PROCEDURE — 36415 COLL VENOUS BLD VENIPUNCTURE: CPT

## 2018-05-11 ENCOUNTER — HOSPITAL ENCOUNTER (OUTPATIENT)
Dept: HOSPITAL 53 - SKLAB8 | Age: 83
End: 2018-05-11
Attending: FAMILY MEDICINE
Payer: MEDICARE

## 2018-05-11 DIAGNOSIS — C23: Primary | ICD-10-CM

## 2018-05-11 LAB
ALPHA FETOPROTEIN TUMOR QUANT: 7.8 NG/ML (ref ?–8.1)
CA 125: 15.2 U/ML (ref ?–30.2)
CANCER AG19-9 SERPL-ACNC: 12 U/ML (ref ?–35)
CARCINOEMBRYONIC ANTIGEN: 2.1 NG/ML (ref ?–2.5)

## 2018-05-11 PROCEDURE — 82378 CARCINOEMBRYONIC ANTIGEN: CPT

## 2018-06-21 ENCOUNTER — HOSPITAL ENCOUNTER (OUTPATIENT)
Dept: HOSPITAL 53 - SKLAB8 | Age: 83
End: 2018-06-21
Attending: FAMILY MEDICINE
Payer: MEDICARE

## 2018-06-21 DIAGNOSIS — Z51.81: ICD-10-CM

## 2018-06-21 DIAGNOSIS — N18.9: Primary | ICD-10-CM

## 2018-06-21 DIAGNOSIS — E11.9: ICD-10-CM

## 2018-06-21 DIAGNOSIS — Z79.899: ICD-10-CM

## 2018-06-21 LAB
ANION GAP: 8 MEQ/L (ref 8–16)
BLOOD UREA NITROGEN: 17 MG/DL (ref 7–18)
CALCIUM LEVEL: 7.8 MG/DL (ref 8.8–10.2)
CARBON DIOXIDE LEVEL: 28 MEQ/L (ref 21–32)
CHLORIDE LEVEL: 103 MEQ/L (ref 98–107)
CREATININE FOR GFR: 0.91 MG/DL (ref 0.7–1.3)
EST. AVERAGE GLUCOSE BLD GHB EST-MCNC: 235 MG/DL (ref 60–110)
GFR SERPL CREATININE-BSD FRML MDRD: > 60 ML/MIN/{1.73_M2} (ref 35–?)
GLUCOSE, FASTING: 363 MG/DL (ref 70–100)
HEMATOCRIT: 32.6 % (ref 42–52)
HEMOGLOBIN: 10.5 G/DL (ref 13.5–17.5)
MEAN CORPUSCULAR HEMOGLOBIN: 29.7 PG (ref 27–33)
MEAN CORPUSCULAR HGB CONC: 32.2 G/DL (ref 32–36.5)
MEAN CORPUSCULAR VOLUME: 92.4 FL (ref 80–96)
NRBC BLD AUTO-RTO: 0 % (ref 0–0)
PLATELET COUNT, AUTOMATED: 306 10^3/UL (ref 150–450)
POTASSIUM SERUM: 4.3 MEQ/L (ref 3.5–5.1)
RED BLOOD COUNT: 3.53 10^6/UL (ref 4.3–6.1)
RED CELL DISTRIBUTION WIDTH: 14 % (ref 11.5–14.5)
SODIUM LEVEL: 139 MEQ/L (ref 136–145)
WHITE BLOOD COUNT: 9.1 10^3/UL (ref 4–10)

## 2018-06-21 PROCEDURE — 80188 ASSAY OF PRIMIDONE: CPT

## 2018-06-24 LAB — LEVETIRACETAM SERPL-MCNC: 23.6 UG/ML (ref 10–40)

## 2018-06-26 LAB
PHENOBARBITAL (PRIMIDONE): 9 UG/ML (ref 15–40)
PRIMIDONE, SERUM: 2.5 UG/ML (ref 5–12)

## 2018-07-23 ENCOUNTER — HOSPITAL ENCOUNTER (OUTPATIENT)
Dept: HOSPITAL 53 - SKLAB8 | Age: 83
End: 2018-07-23
Attending: FAMILY MEDICINE
Payer: MEDICARE

## 2018-07-23 DIAGNOSIS — N18.9: ICD-10-CM

## 2018-07-23 DIAGNOSIS — I12.9: Primary | ICD-10-CM

## 2018-07-23 LAB
ANION GAP: 9 MEQ/L (ref 8–16)
BLOOD UREA NITROGEN: 24 MG/DL (ref 7–18)
CALCIUM LEVEL: 7.8 MG/DL (ref 8.8–10.2)
CARBON DIOXIDE LEVEL: 25 MEQ/L (ref 21–32)
CHLORIDE LEVEL: 102 MEQ/L (ref 98–107)
CREATININE FOR GFR: 0.96 MG/DL (ref 0.7–1.3)
GFR SERPL CREATININE-BSD FRML MDRD: > 60 ML/MIN/{1.73_M2} (ref 35–?)
GLUCOSE, FASTING: 509 MG/DL (ref 70–100)
HEMATOCRIT: 35.4 % (ref 42–52)
HEMOGLOBIN: 11.5 G/DL (ref 13.5–17.5)
MEAN CORPUSCULAR HEMOGLOBIN: 30.5 PG (ref 27–33)
MEAN CORPUSCULAR HGB CONC: 32.5 G/DL (ref 32–36.5)
MEAN CORPUSCULAR VOLUME: 93.9 FL (ref 80–96)
NRBC BLD AUTO-RTO: 0 % (ref 0–0)
PLATELET COUNT, AUTOMATED: 298 10^3/UL (ref 150–450)
POTASSIUM SERUM: 5.4 MEQ/L (ref 3.5–5.1)
RED BLOOD COUNT: 3.77 10^6/UL (ref 4.3–6.1)
RED CELL DISTRIBUTION WIDTH: 14.1 % (ref 11.5–14.5)
SODIUM LEVEL: 136 MEQ/L (ref 136–145)
WHITE BLOOD COUNT: 7.7 10^3/UL (ref 4–10)

## 2018-07-23 PROCEDURE — 36415 COLL VENOUS BLD VENIPUNCTURE: CPT

## 2018-09-20 ENCOUNTER — HOSPITAL ENCOUNTER (OUTPATIENT)
Dept: HOSPITAL 53 - SKLAB8 | Age: 83
End: 2018-09-20
Attending: FAMILY MEDICINE
Payer: MEDICARE

## 2018-09-20 DIAGNOSIS — F03.90: ICD-10-CM

## 2018-09-20 DIAGNOSIS — I10: ICD-10-CM

## 2018-09-20 DIAGNOSIS — E55.9: ICD-10-CM

## 2018-09-20 DIAGNOSIS — E03.9: ICD-10-CM

## 2018-09-20 DIAGNOSIS — R56.9: ICD-10-CM

## 2018-09-20 DIAGNOSIS — G20: Primary | ICD-10-CM

## 2018-09-20 DIAGNOSIS — E11.9: ICD-10-CM

## 2018-09-20 DIAGNOSIS — E78.5: ICD-10-CM

## 2018-09-20 LAB
ANION GAP: 8 MEQ/L (ref 8–16)
BLOOD UREA NITROGEN: 18 MG/DL (ref 7–18)
CALCIUM LEVEL: 8.4 MG/DL (ref 8.8–10.2)
CARBON DIOXIDE LEVEL: 28 MEQ/L (ref 21–32)
CHLORIDE LEVEL: 102 MEQ/L (ref 98–107)
CREATININE FOR GFR: 0.8 MG/DL (ref 0.7–1.3)
EST. AVERAGE GLUCOSE BLD GHB EST-MCNC: 206 MG/DL (ref 60–110)
GFR SERPL CREATININE-BSD FRML MDRD: > 60 ML/MIN/{1.73_M2} (ref 35–?)
GLUCOSE, FASTING: 271 MG/DL (ref 70–100)
HEMATOCRIT: 33.4 % (ref 42–52)
HEMOGLOBIN: 11.2 G/DL (ref 13.5–17.5)
MEAN CORPUSCULAR HEMOGLOBIN: 31.1 PG (ref 27–33)
MEAN CORPUSCULAR HGB CONC: 33.5 G/DL (ref 32–36.5)
MEAN CORPUSCULAR VOLUME: 92.8 FL (ref 80–96)
NRBC BLD AUTO-RTO: 0 % (ref 0–0)
PLATELET COUNT, AUTOMATED: 340 10^3/UL (ref 150–450)
POTASSIUM SERUM: 4.9 MEQ/L (ref 3.5–5.1)
RED BLOOD COUNT: 3.6 10^6/UL (ref 4.3–6.1)
RED CELL DISTRIBUTION WIDTH: 13.7 % (ref 11.5–14.5)
SODIUM LEVEL: 138 MEQ/L (ref 136–145)
WHITE BLOOD COUNT: 8.9 10^3/UL (ref 4–10)

## 2018-09-20 PROCEDURE — 83036 HEMOGLOBIN GLYCOSYLATED A1C: CPT

## 2018-09-23 ENCOUNTER — HOSPITAL ENCOUNTER (INPATIENT)
Dept: HOSPITAL 53 - M ICU | Age: 83
LOS: 2 days | Discharge: NURSING, REHAB OR LONG-TERM CARE | DRG: 871 | End: 2018-09-25
Attending: FAMILY MEDICINE | Admitting: INTERNAL MEDICINE
Payer: MEDICARE

## 2018-09-23 DIAGNOSIS — A41.9: Primary | ICD-10-CM

## 2018-09-23 DIAGNOSIS — G40.909: ICD-10-CM

## 2018-09-23 DIAGNOSIS — J18.9: ICD-10-CM

## 2018-09-23 DIAGNOSIS — R57.0: ICD-10-CM

## 2018-09-23 DIAGNOSIS — E78.5: ICD-10-CM

## 2018-09-23 DIAGNOSIS — G20: ICD-10-CM

## 2018-09-23 DIAGNOSIS — R65.21: ICD-10-CM

## 2018-09-23 DIAGNOSIS — I25.10: ICD-10-CM

## 2018-09-23 DIAGNOSIS — K56.41: ICD-10-CM

## 2018-09-23 DIAGNOSIS — Z51.5: ICD-10-CM

## 2018-09-23 DIAGNOSIS — G93.41: ICD-10-CM

## 2018-09-23 DIAGNOSIS — I10: ICD-10-CM

## 2018-09-23 DIAGNOSIS — E55.9: ICD-10-CM

## 2018-09-23 DIAGNOSIS — E03.9: ICD-10-CM

## 2018-09-23 DIAGNOSIS — C23: ICD-10-CM

## 2018-09-23 DIAGNOSIS — Z79.899: ICD-10-CM

## 2018-09-23 DIAGNOSIS — F02.80: ICD-10-CM

## 2018-09-23 DIAGNOSIS — I21.4: ICD-10-CM

## 2018-09-23 DIAGNOSIS — Z66: ICD-10-CM

## 2018-09-23 DIAGNOSIS — E87.2: ICD-10-CM

## 2018-09-23 DIAGNOSIS — N17.9: ICD-10-CM

## 2018-09-23 DIAGNOSIS — E11.9: ICD-10-CM

## 2018-09-23 DIAGNOSIS — N40.0: ICD-10-CM

## 2018-09-23 DIAGNOSIS — Z79.4: ICD-10-CM

## 2018-09-23 LAB
ALBUMIN/GLOBULIN RATIO: 0.78 (ref 1–1.93)
ALBUMIN: 2.8 GM/DL (ref 3.2–5.2)
ALKALINE PHOSPHATASE: 257 U/L (ref 45–117)
ALT SERPL W P-5'-P-CCNC: 26 U/L (ref 12–78)
AMYLASE SERPL-CCNC: 79 U/L (ref 25–115)
ANION GAP: 12 MEQ/L (ref 8–16)
APPEARANCE, URINE: CLEAR
AST SERPL-CCNC: 182 U/L (ref 7–37)
BACTERIA UR QL AUTO: NEGATIVE
BASO #: 0.1 10^3/UL (ref 0–0.2)
BASO %: 0.3 % (ref 0–1)
BILIRUB CONJ SERPL-MCNC: 1.2 MG/DL (ref 0–0.2)
BILIRUBIN, URINE AUTO: NEGATIVE
BILIRUBIN,TOTAL: 1.4 MG/DL (ref 0.2–1)
BLOOD UREA NITROGEN: 25 MG/DL (ref 7–18)
BLOOD, URINE BLOOD: NEGATIVE
CALCIUM LEVEL: 7.6 MG/DL (ref 8.8–10.2)
CARBON DIOXIDE LEVEL: 21 MEQ/L (ref 21–32)
CHLORIDE LEVEL: 107 MEQ/L (ref 98–107)
CK MB CFR.DF SERPL CALC: 0.81
CK SERPL-CCNC: 235 U/L (ref 39–308)
CK-MB VALUE MASS: 2 NG/ML (ref ?–3.6)
CREATININE FOR GFR: 1.19 MG/DL (ref 0.7–1.3)
CRP SERPL-MCNC: 1.68 MG/DL (ref 0–0.3)
EOS #: 0 10^3/UL (ref 0–0.5)
EOSINOPHIL NFR BLD AUTO: 0.1 % (ref 0–3)
FLUAV RNA UPPER RESP QL NAA+PROBE: NEGATIVE
GFR SERPL CREATININE-BSD FRML MDRD: > 60 ML/MIN/{1.73_M2} (ref 35–?)
GLUCOSE BLDC GLUCOMTR-MCNC: 238 MG/DL (ref 83–110)
GLUCOSE, FASTING: 241 MG/DL (ref 70–100)
GLUCOSE, URINE (UA) AUTO: (no result) MG/DL
HEMATOCRIT: 33 % (ref 42–52)
HEMOGLOBIN: 10.7 G/DL (ref 13.5–17.5)
IMMATURE GRANULOCYTE %: 3.7 % (ref 0–3)
INFLUENZA B AMPLIFICATION: NEGATIVE
INR: 1.11
KETONE, URINE AUTO: (no result) MG/DL
LACTIC ACID SEPSIS PROTOCOL: 4.8 MMOL/L (ref 0.4–2)
LEUKOCYTE ESTERASE UR QL STRIP.AUTO: NEGATIVE
LYMPH #: 0.2 10^3/UL (ref 1.5–4.5)
LYMPH %: 0.7 % (ref 24–44)
MEAN CORPUSCULAR HEMOGLOBIN: 30.6 PG (ref 27–33)
MEAN CORPUSCULAR HGB CONC: 32.4 G/DL (ref 32–36.5)
MEAN CORPUSCULAR VOLUME: 94.3 FL (ref 80–96)
MONO #: 0.3 10^3/UL (ref 0–0.8)
MONO %: 1 % (ref 0–5)
MUCUS, URINE: (no result)
NEUTROPHILS #: 28.6 10^3/UL (ref 1.8–7.7)
NEUTROPHILS %: 94.2 % (ref 36–66)
NITRITE, URINE AUTO: NEGATIVE
NRBC BLD AUTO-RTO: 0.1 % (ref 0–0)
PARTIAL THROMBOPLASTIN TIME: 29.2 SECONDS (ref 25.4–37.6)
PH,URINE: 6 UNITS (ref 5–9)
PLATELET COUNT, AUTOMATED: 388 10^3/UL (ref 150–450)
POS COUNT: (no result)
POSITIVE DIFF: (no result)
POTASSIUM SERUM: 4.7 MEQ/L (ref 3.5–5.1)
PROT UR QL STRIP.AUTO: NEGATIVE MG/DL
PROTHROMBIN TIME: 14.5 SECONDS (ref 12.1–14.4)
RBC, URINE AUTO: 1 /HPF (ref 0–3)
RED BLOOD COUNT: 3.5 10^6/UL (ref 4.3–6.1)
RED CELL DISTRIBUTION WIDTH: 13.9 % (ref 11.5–14.5)
SODIUM LEVEL: 140 MEQ/L (ref 136–145)
SPECIFIC GRAVITY URINE AUTO: 1.02 (ref 1–1.03)
SQUAMOUS #/AREA URNS AUTO: 1 /HPF (ref 0–6)
TOTAL PROTEIN: 6.4 GM/DL (ref 6.4–8.2)
TROPONIN I: 0.11 NG/ML (ref ?–0.1)
UROBILINOGEN, URINE AUTO: 4 MG/DL (ref 0–2)
VENOUS BASE EXCESS: -2.5 (ref -2–2)
VENOUS HCO3: 21.3 MEQ/L (ref 23–27)
VENOUS O2 SATURATION: 95.2 % (ref 60–80)
VENOUS PARTIAL PRESSURE CO2: 33.4 MMHG (ref 38–50)
VENOUS PARTIAL PRESSURE O2: 71.8 MMHG (ref 30–50)
VENOUS PH: 7.42 UNITS (ref 7.33–7.43)
VENOUS STANDARD HCO3: 22.4 MEQ/L
VENOUS TOTAL CO2: 22.3 MEQ/L (ref 24–28)
WBC, URINE AUTO: 1 /HPF (ref 0–3)
WHITE BLOOD COUNT: 30.3 10^3/UL (ref 4–10)

## 2018-09-23 RX ADMIN — DEXTROSE AND SODIUM CHLORIDE 1 MLS/HR: 5; 450 INJECTION, SOLUTION INTRAVENOUS at 22:02

## 2018-09-23 RX ADMIN — INSULIN DETEMIR 1 UNITS: 100 INJECTION, SOLUTION SUBCUTANEOUS at 22:22

## 2018-09-23 RX ADMIN — SODIUM CHLORIDE 1 MLS/HR: 9 INJECTION, SOLUTION INTRAVENOUS at 19:00

## 2018-09-23 RX ADMIN — PRIMIDONE 1 MG: 50 TABLET ORAL at 21:00

## 2018-09-23 RX ADMIN — SODIUM CHLORIDE 1 UNITS: 4.5 INJECTION, SOLUTION INTRAVENOUS at 22:25

## 2018-09-23 RX ADMIN — SODIUM CHLORIDE 1 MLS/HR: 9 INJECTION, SOLUTION INTRAVENOUS at 19:45

## 2018-09-23 RX ADMIN — CEFEPIME HYDROCHLORIDE 1 MLS/HR: 1 INJECTION, POWDER, FOR SOLUTION INTRAMUSCULAR; INTRAVENOUS at 22:26

## 2018-09-23 RX ADMIN — ACETAMINOPHEN 1 MG: 650 SUPPOSITORY RECTAL at 18:30

## 2018-09-23 RX ADMIN — LEVETIRACETAM 1 MG: 250 TABLET, FILM COATED ORAL at 22:04

## 2018-09-23 RX ADMIN — SODIUM CHLORIDE 1 MLS/HR: 9 INJECTION, SOLUTION INTRAVENOUS at 17:45

## 2018-09-23 RX ADMIN — LORAZEPAM 1 MG: 2 INJECTION INTRAMUSCULAR; INTRAVENOUS at 17:35

## 2018-09-23 RX ADMIN — KETOROLAC TROMETHAMINE 1 MG: 30 INJECTION, SOLUTION INTRAMUSCULAR at 19:45

## 2018-09-23 RX ADMIN — DEXTROSE MONOHYDRATE 1 MLS/HR: 5 INJECTION INTRAVENOUS at 19:42

## 2018-09-24 LAB
ALBUMIN/GLOBULIN RATIO: 0.65 (ref 1–1.93)
ALBUMIN: 2.2 GM/DL (ref 3.2–5.2)
ALKALINE PHOSPHATASE: 170 U/L (ref 45–117)
ALT SERPL W P-5'-P-CCNC: 14 U/L (ref 12–78)
ANION GAP: 9 MEQ/L (ref 8–16)
AST SERPL-CCNC: 143 U/L (ref 7–37)
BILIRUBIN,TOTAL: 2.1 MG/DL (ref 0.2–1)
BLOOD UREA NITROGEN: 28 MG/DL (ref 7–18)
CALCIUM LEVEL: 7.1 MG/DL (ref 8.8–10.2)
CARBON DIOXIDE LEVEL: 18 MEQ/L (ref 21–32)
CHLORIDE LEVEL: 113 MEQ/L (ref 98–107)
CREATININE FOR GFR: 1.35 MG/DL (ref 0.7–1.3)
GFR SERPL CREATININE-BSD FRML MDRD: 53.7 ML/MIN/{1.73_M2} (ref 35–?)
GLUCOSE BLDC GLUCOMTR-MCNC: 169 MG/DL (ref 83–110)
GLUCOSE, FASTING: 201 MG/DL (ref 70–100)
HEMATOCRIT: 30.1 % (ref 42–52)
HEMOGLOBIN: 9.8 G/DL (ref 13.5–17.5)
LACTIC ACID SEPSIS PROTOCOL: 3.1 MMOL/L (ref 0.4–2)
LACTIC ACID SEPSIS PROTOCOL: 4.2 MMOL/L (ref 0.4–2)
MEAN CORPUSCULAR HEMOGLOBIN: 31.2 PG (ref 27–33)
MEAN CORPUSCULAR HGB CONC: 32.6 G/DL (ref 32–36.5)
MEAN CORPUSCULAR VOLUME: 95.9 FL (ref 80–96)
NRBC BLD AUTO-RTO: 0 % (ref 0–0)
PLATELET COUNT, AUTOMATED: 346 10^3/UL (ref 150–450)
POS COUNT: (no result)
POTASSIUM SERUM: 5.1 MEQ/L (ref 3.5–5.1)
REASON FOR REVIEW: (no result)
RED BLOOD COUNT: 3.14 10^6/UL (ref 4.3–6.1)
RED CELL DISTRIBUTION WIDTH: 14.2 % (ref 11.5–14.5)
SODIUM LEVEL: 140 MEQ/L (ref 136–145)
SOURCE: (no result)
TOTAL PROTEIN: 5.6 GM/DL (ref 6.4–8.2)
TROPONIN I: 11.1 NG/ML (ref ?–0.1)
TROPONIN I: 13.6 NG/ML (ref ?–0.1)
TROPONIN I: 15.5 NG/ML (ref ?–0.1)
TROPONIN I: 2.57 NG/ML (ref ?–0.1)
TROPONIN I: 7.47 NG/ML (ref ?–0.1)
WHITE BLOOD COUNT: 60.2 10^3/UL (ref 4–10)

## 2018-09-24 RX ADMIN — INSULIN LISPRO 1 UNITS: 100 INJECTION, SOLUTION INTRAVENOUS; SUBCUTANEOUS at 12:00

## 2018-09-24 RX ADMIN — CARBIDOPA AND LEVODOPA 1 TAB: 25; 100 TABLET ORAL at 12:00

## 2018-09-24 RX ADMIN — ENOXAPARIN SODIUM 1 MG: 80 INJECTION SUBCUTANEOUS at 02:15

## 2018-09-24 RX ADMIN — PRIMIDONE 1 MG: 50 TABLET ORAL at 09:00

## 2018-09-24 RX ADMIN — DEXTROSE MONOHYDRATE 1 MLS/HR: 50 INJECTION, SOLUTION INTRAVENOUS at 00:38

## 2018-09-24 RX ADMIN — BISACODYL 1 MG: 10 SUPPOSITORY RECTAL at 16:02

## 2018-09-24 RX ADMIN — LEVOTHYROXINE SODIUM 1 MCG: 75 TABLET ORAL at 06:18

## 2018-09-24 RX ADMIN — PRIMIDONE 1 MG: 50 TABLET ORAL at 00:33

## 2018-09-24 RX ADMIN — CARBIDOPA AND LEVODOPA 1 TAB: 25; 100 TABLET ORAL at 06:16

## 2018-09-24 RX ADMIN — SODIUM CHLORIDE 1 MLS/HR: 9 INJECTION, SOLUTION INTRAVENOUS at 15:49

## 2018-09-24 RX ADMIN — CARBIDOPA AND LEVODOPA 1 TAB: 25; 100 TABLET, EXTENDED RELEASE ORAL at 00:31

## 2018-09-24 RX ADMIN — INSULIN LISPRO 1 UNITS: 100 INJECTION, SOLUTION INTRAVENOUS; SUBCUTANEOUS at 07:30

## 2018-09-24 RX ADMIN — SODIUM CHLORIDE 1 MLS/HR: 9 INJECTION, SOLUTION INTRAVENOUS at 11:15

## 2018-09-24 RX ADMIN — LEVOTHYROXINE SODIUM 1 MCG: 100 TABLET ORAL at 06:18

## 2018-09-24 RX ADMIN — ENOXAPARIN SODIUM 1 MG: 80 INJECTION SUBCUTANEOUS at 12:11

## 2018-09-24 RX ADMIN — SODIUM CHLORIDE, PRESERVATIVE FREE 1 ML: 5 INJECTION INTRAVENOUS at 22:00

## 2018-09-24 RX ADMIN — ROSUVASTATIN CALCIUM 1 MG: 10 TABLET, FILM COATED ORAL at 09:00

## 2018-09-24 RX ADMIN — LEVETIRACETAM 1 MG: 250 TABLET, FILM COATED ORAL at 09:00

## 2018-09-24 RX ADMIN — DEXTROSE MONOHYDRATE 1 MLS/HR: 50 INJECTION, SOLUTION INTRAVENOUS at 12:11

## 2018-09-24 RX ADMIN — CEFEPIME HYDROCHLORIDE 1 MLS/HR: 1 INJECTION, POWDER, FOR SOLUTION INTRAMUSCULAR; INTRAVENOUS at 13:37

## 2018-09-24 RX ADMIN — SODIUM CHLORIDE 1 MLS/HR: 9 INJECTION, SOLUTION INTRAVENOUS at 01:00

## 2018-09-24 RX ADMIN — ASPIRIN 1 MG: 81 TABLET ORAL at 09:00

## 2018-09-24 RX ADMIN — SERTRALINE HYDROCHLORIDE 1 MG: 25 TABLET ORAL at 09:00

## 2018-09-24 RX ADMIN — ASPIRIN 81 MG CHEWABLE TABLET 1 MG: 81 TABLET CHEWABLE at 01:00

## 2018-09-24 RX ADMIN — INSULIN DETEMIR 1 UNITS: 100 INJECTION, SOLUTION SUBCUTANEOUS at 09:00

## 2018-09-24 RX ADMIN — SODIUM CHLORIDE 1 MLS/HR: 9 INJECTION, SOLUTION INTRAVENOUS at 04:37

## 2018-09-25 RX ADMIN — MORPHINE SULFATE 1 MG: 4 INJECTION INTRAVENOUS at 10:00

## 2018-09-25 RX ADMIN — MORPHINE SULFATE 1 MG: 10 SOLUTION ORAL at 03:37

## 2018-09-25 RX ADMIN — LORAZEPAM 1 MG: 2 INJECTION INTRAMUSCULAR; INTRAVENOUS at 03:38

## 2018-09-25 RX ADMIN — LORAZEPAM 1 MG: 2 INJECTION INTRAMUSCULAR; INTRAVENOUS at 09:11

## 2018-09-25 RX ADMIN — SODIUM CHLORIDE, PRESERVATIVE FREE 1 ML: 5 INJECTION INTRAVENOUS at 06:00

## 2018-09-26 LAB
LEUKOCYTE ALKALINE PHOSPHATASE: 180 (ref 25–130)
LEVETIRACETAM SERPL-MCNC: 15.7 UG/ML (ref 10–40)

## 2018-09-27 LAB — BACTERIA SPEC BFLD CULT: (no result)
